# Patient Record
Sex: MALE | Race: WHITE | NOT HISPANIC OR LATINO | Employment: UNEMPLOYED | URBAN - METROPOLITAN AREA
[De-identification: names, ages, dates, MRNs, and addresses within clinical notes are randomized per-mention and may not be internally consistent; named-entity substitution may affect disease eponyms.]

---

## 2020-07-09 ENCOUNTER — HOSPITAL ENCOUNTER (OUTPATIENT)
Dept: RADIOLOGY | Facility: HOSPITAL | Age: 25
Discharge: HOME | End: 2020-07-09
Attending: ORTHOPAEDIC SURGERY
Payer: COMMERCIAL

## 2020-07-09 DIAGNOSIS — M25.512 LEFT SHOULDER PAIN: ICD-10-CM

## 2020-07-09 DIAGNOSIS — M25.512 LEFT SHOULDER PAIN: Primary | ICD-10-CM

## 2020-07-09 PROCEDURE — 73030 X-RAY EXAM OF SHOULDER: CPT | Mod: TC,LT

## 2021-08-06 ENCOUNTER — CLINICAL SUPPORT (OUTPATIENT)
Dept: URGENT CARE | Facility: CLINIC | Age: 26
End: 2021-08-06
Payer: COMMERCIAL

## 2021-08-06 DIAGNOSIS — Z02.83 ENCOUNTER FOR DRUG SCREENING: Primary | ICD-10-CM

## 2024-05-29 ENCOUNTER — OFFICE VISIT (OUTPATIENT)
Dept: NEUROLOGY | Facility: CLINIC | Age: 29
End: 2024-05-29
Payer: COMMERCIAL

## 2024-05-29 VITALS
OXYGEN SATURATION: 100 % | WEIGHT: 163.38 LBS | HEART RATE: 65 BPM | BODY MASS INDEX: 21.65 KG/M2 | DIASTOLIC BLOOD PRESSURE: 82 MMHG | SYSTOLIC BLOOD PRESSURE: 128 MMHG | HEIGHT: 73 IN

## 2024-05-29 DIAGNOSIS — R56.9 SEIZURE: Primary | ICD-10-CM

## 2024-05-29 PROCEDURE — 1159F MED LIST DOCD IN RCRD: CPT | Mod: CPTII,S$GLB,, | Performed by: NURSE PRACTITIONER

## 2024-05-29 PROCEDURE — 3074F SYST BP LT 130 MM HG: CPT | Mod: CPTII,S$GLB,, | Performed by: NURSE PRACTITIONER

## 2024-05-29 PROCEDURE — 3008F BODY MASS INDEX DOCD: CPT | Mod: CPTII,S$GLB,, | Performed by: NURSE PRACTITIONER

## 2024-05-29 PROCEDURE — 3079F DIAST BP 80-89 MM HG: CPT | Mod: CPTII,S$GLB,, | Performed by: NURSE PRACTITIONER

## 2024-05-29 PROCEDURE — 99999 PR PBB SHADOW E&M-EST. PATIENT-LVL III: CPT | Mod: PBBFAC,,, | Performed by: NURSE PRACTITIONER

## 2024-05-29 PROCEDURE — 99204 OFFICE O/P NEW MOD 45 MIN: CPT | Mod: S$GLB,,, | Performed by: NURSE PRACTITIONER

## 2024-05-29 RX ORDER — MESALAMINE 1.2 G/1
1.2 TABLET, DELAYED RELEASE ORAL
COMMUNITY

## 2024-05-29 NOTE — LETTER
May 29, 2024    Andrey Rodriguez  [address] [address]             [facility] Neurology  [address] [address]  Phone: [phone]  Fax: [phone] To Whom it May Concern:     Mr. Rodriugez was advised to adhere to seizure precautions for 6 months from his event on 5/10/24. This includes no driving, no cooking on a stove unattended, no heights/ladders, no working near water, no swimming unattended, no tub baths/take showers instead, no operating heavy machinery/power tools. If there are no other events, seizure precautions will end on 11/10/24, and he will be free to work without restriction.     Sincerely,        Cat Hylton, NP

## 2024-05-29 NOTE — PROGRESS NOTES
"Subjective:      Chief Complaint:  No chief complaint on file.      History of Present Illness  Andrey Rodriguez is a 28 y.o. male with "seizure". He has UC. He is a smoker.     Patient lives in [place].     He presents today for an initial visit for evaluation of "seizures". Patient has no memory of the event, aside from smoking a CBD vape for the first time just prior to the start of his episode. Patient fell off the couch and began shaking this morning in the early morning hours on 5/10/24. This was witnessed by his sister. Convulsions lasted 5-10 minutes. Episode was followed by incontinence. EMS was called and came to the emergency department where he had an additional seizure. Patient was given several rounds of Ativan and Versed, as well as 1000 mg of IV Keppra. He denies head trauma, alcohol or drug use. No recent new medications. Preceding this event, no issues with headaches, focal neurological deficits. Per father, no issues with seizures as a child. No sleep deprivation, excessive heat exposure prior to event.     He was taken to [facility].No underlying cause found-normal CT/MRI/EEG. Urine drug screen negative. Denying alcohol. No history of seizure disorder. Consulted with Neurology who recommends staying on Keppra for now, no driving until follow-up. He was also given Augmentin to cover possible pneumonia, due to aspiration following seizure events. No family history of seizures.     He is not currently driving.     He took Keppra for a few days, then stopped taking this due to insomnia and agitation. No further events. He admits that he started vaping CBD products around the time of his events. He stopped vaping.     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     His UC is well controlled. No recent flares. He consumes a regular diet.     I have reviewed all of this patient's past medical and surgical histories as well as family and social histories and active allergies and " medications as documented in the electronic medical record.    Review of Systems  Review of Systems   Constitutional:  Negative for activity change, appetite change, fatigue, fever and unexpected weight change.   HENT:  Negative for congestion, drooling, ear pain, hearing loss, mouth sores, sinus pressure, tinnitus, trouble swallowing and voice change.    Eyes: Negative.  Negative for photophobia and visual disturbance.         No Blurred vision   Respiratory:  Negative for apnea, choking and shortness of breath.    Cardiovascular:  Negative for chest pain, palpitations and leg swelling.   Gastrointestinal:  Negative for constipation, diarrhea, nausea and vomiting.   Genitourinary:  Negative for dysuria.   Musculoskeletal:  Negative for arthralgias, back pain, gait problem, joint swelling, myalgias, neck pain and neck stiffness.   Skin:  Negative for rash.   Neurological:  Negative for dizziness, tremors, seizures, syncope, facial asymmetry, speech difficulty, weakness, light-headedness, numbness and headaches.   Hematological:  Does not bruise/bleed easily.   Psychiatric/Behavioral:  Negative for agitation, behavioral problems, confusion, decreased concentration, dysphoric mood, hallucinations, sleep disturbance and suicidal ideas. The patient is not nervous/anxious.        Objective:     There were no vitals filed for this visit.       Exam:  Gen Appearance, well developed/nourished in no apparent distress  CV: 2+ distal pulses with no edema or swelling  Neuro:  MS: Awake, alert, oriented to place, person, time, situation. Sustains attention. Recent/remote memory intact, Language is full to spontaneous speech/repetition/naming/comprehension. Fund of Knowledge is full  CN: Optic discs are flat with normal vasculature, PERRL, Extraoccular movements and visual fields are full. Normal facial sensation and strength, Hearing symmetric, Tongue and Palate are midline and strong. Shoulder Shrug symmetric and  strong.  Motor: Normal bulk, tone, no abnormal movements. 5/5 strength bilateral upper/lower extremities with 2+ reflexes and bilateral plantar response  Sensory: symmetric to light touch, pain, temp, and vibration/proprioception. Romberg negative  Cerebellar: Finger-nose,Heal-shin, Rapid alternating movements intact  Gait: Normal stance, no ataxia    Imagin/10/24 MRI Brain:  Unremarkable brain MRI without contrast. No acute infarct. No specific findings to indicate a cause for seizures or sequela of seizure activity.     CXR 5/10/24:  Questionable small left basilar infiltrate. PA and lateral radiographs may be of benefit for further evaluation.     CONSULTING PHYSICIAN: LYDIA CASTILLO MD    DATE OF CONSULT: 05/10/2024    EEG RECORD NUMBER:  Case #[id number].    HISTORY:  The patient is a 28-year-old male presenting with breakthrough seizure activity with new onset seizure activity.    RECORDING DESCRIPTION:  A portable 18-channel recording is obtained on this patient during awake, drowsy, and light sleep states. During wakefulness, posteriorly dominant alpha rhythm of between 10 and 12 hertz in frequency and 80 microvolts in amplitude could be identified. During drowsiness, slower frequency components appear symmetrically over both hemispheres. Central vertex wave activity is seen during appropriate stages of sleep.     Simultaneous EKG monitoring reveals a normal sinus rhythm with a rate of 72 beats per minute.    IMPRESSION:  This is a normal waking, drowsy and light sleep recording.    REFERRING DIAGNOSIS:    RECORDING CONDITIONS:    MEDICATIONS:    EEG FINDINGS:    Dictated By: LYDIA CASTILLO MD    Labs:   5/10/24 UDS + for benzos, but given Ativan per ER, BMP showed low potassium, CBC showed leukocytosis  Assessment:   Andrey Rodriguez is a 28 y.o. male with suspected seizure x 2 on 5/10/24 with no prior history.   Plan:   I recommend:   Clinical picture most suggestive of seizure. It is  unclear if episode was epileptic or triggered by using a CBD vape for the first time just prior to his episode. EEG during admit was unremarkable; however, this does not rule out epilepsy.   -repeat EEG, as he was heavily medication at the time of his EEG.  MRI Brain unrevealing for contributing structural lesion.   He stopped taking Keppra, due to agitation and insomnia.   4. Seizure precautions reviewed. For 6 months after event on 5/10/24, no driving, no cooking on a stove unattended, no heights/ladders, no working near water, no swimming unattended, no tub baths/take showers instead, no operating heavy machinery/power tools. If there are no other events, seizure precautions will end on 11/10/24.   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 He is frustrated with seizure precautions.     FU 6 months    CC:  Dr. Luna Rodriguez

## 2024-08-06 ENCOUNTER — TELEPHONE (OUTPATIENT)
Dept: NEUROLOGY | Facility: CLINIC | Age: 29
End: 2024-08-06
Payer: COMMERCIAL

## 2024-08-14 ENCOUNTER — OFFICE VISIT (OUTPATIENT)
Dept: NEUROLOGY | Facility: CLINIC | Age: 29
End: 2024-08-14
Payer: COMMERCIAL

## 2024-08-14 VITALS
BODY MASS INDEX: 23.95 KG/M2 | DIASTOLIC BLOOD PRESSURE: 78 MMHG | OXYGEN SATURATION: 98 % | HEIGHT: 73 IN | WEIGHT: 180.69 LBS | SYSTOLIC BLOOD PRESSURE: 118 MMHG | RESPIRATION RATE: 16 BRPM | HEART RATE: 84 BPM

## 2024-08-14 DIAGNOSIS — R56.9 SEIZURE: Primary | ICD-10-CM

## 2024-08-14 PROCEDURE — 3008F BODY MASS INDEX DOCD: CPT | Mod: CPTII,S$GLB,, | Performed by: NURSE PRACTITIONER

## 2024-08-14 PROCEDURE — 99999 PR PBB SHADOW E&M-EST. PATIENT-LVL III: CPT | Mod: PBBFAC,,, | Performed by: NURSE PRACTITIONER

## 2024-08-14 PROCEDURE — 3074F SYST BP LT 130 MM HG: CPT | Mod: CPTII,S$GLB,, | Performed by: NURSE PRACTITIONER

## 2024-08-14 PROCEDURE — 1159F MED LIST DOCD IN RCRD: CPT | Mod: CPTII,S$GLB,, | Performed by: NURSE PRACTITIONER

## 2024-08-14 PROCEDURE — 3078F DIAST BP <80 MM HG: CPT | Mod: CPTII,S$GLB,, | Performed by: NURSE PRACTITIONER

## 2024-08-14 PROCEDURE — 99214 OFFICE O/P EST MOD 30 MIN: CPT | Mod: S$GLB,,, | Performed by: NURSE PRACTITIONER

## 2024-08-14 RX ORDER — LEVETIRACETAM 500 MG/1
500 TABLET ORAL 2 TIMES DAILY
Qty: 180 TABLET | Refills: 1 | Status: SHIPPED | OUTPATIENT
Start: 2024-08-14

## 2024-08-14 RX ORDER — LEVETIRACETAM 500 MG/1
500 TABLET ORAL 2 TIMES DAILY
COMMUNITY
End: 2024-08-14 | Stop reason: SDUPTHER

## 2024-08-14 NOTE — PATIENT INSTRUCTIONS
When you get your Keppra level done, you should take your evening Keppra at 8:00 p.m.the evening before your labs. Report to the lab at 7:30 a.m., have your labs drawn, and take your morning Keppra AFTER your labs are drawn.

## 2024-08-14 NOTE — PROGRESS NOTES
"Subjective:      Chief Complaint:  Neurologic Problem (Seizure follow up)      History of Present Illness  Andrey Rodriguez is a 28 y.o. male with "seizure". He has UC. He is a smoker.     Patient lives in [place].     Patient presents today after having a seizure on 8/1/24. He is here with his father today. He was seen in 5/29/24 after a first time seizure. EEG was ordered then, which he did not comply with.     Seizure on 8/1/24. He woke up with a holocephalic, pressure like headache. Three hours later he was walking to fix breakfast, fell to the floor and began convulsing. He did impact his head and sustain a laceration. No symptoms prior to onset, aside from his headache. This was witnessed and lasted for 3-5 minutes. No loss of bladder function. HE had muscle soreness afterwards. He denies sleep deprivation, vaping/drug ingestion, excess heat exposure. He refilled his prior Rx of Keppra that was prescribed after his last episode. He is tolerated this well without agitation or insomnia as prior.     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     Last event on 5/10/24. Patient has no memory of the event, aside from smoking a CBD vape for the first time just prior to the start of his episode. Patient fell off the couch and began shaking this morning in the early morning hours on 5/10/24. This was witnessed by his sister. Convulsions lasted 5-10 minutes. Episode was followed by incontinence. EMS was called and came to the emergency department where he had an additional seizure. Patient was given several rounds of Ativan and Versed, as well as 1000 mg of IV Keppra. He denies head trauma, alcohol or drug use. No recent new medications. Preceding this event, no issues with headaches, focal neurological deficits. Per father, no issues with seizures as a child. No sleep deprivation, excessive heat exposure prior to event.     He was taken to [facility].No underlying cause found-normal " CT/MRI/EEG. Urine drug screen negative. Denying alcohol. No history of seizure disorder. Consulted with Neurology who recommends staying on Keppra for now, no driving until follow-up. He was also given Augmentin to cover possible pneumonia, due to aspiration following seizure events. No family history of seizures.     He took Keppra for a few days, then stopped taking this due to insomnia and agitation. He admits that he started vaping CBD products around the time of his events. He stopped vaping.     I have reviewed all of this patient's past medical and surgical histories as well as family and social histories and active allergies and medications as documented in the electronic medical record.    Review of Systems  Review of Systems   Constitutional:  Negative for activity change, appetite change, fatigue, fever and unexpected weight change.   HENT:  Negative for congestion, drooling, ear pain, hearing loss, mouth sores, sinus pressure, tinnitus, trouble swallowing and voice change.    Eyes: Negative.  Negative for photophobia and visual disturbance.         No Blurred vision   Respiratory:  Negative for apnea, choking and shortness of breath.    Cardiovascular:  Negative for chest pain, palpitations and leg swelling.   Gastrointestinal:  Negative for constipation, diarrhea, nausea and vomiting.   Genitourinary:  Negative for dysuria.   Musculoskeletal:  Negative for arthralgias, back pain, gait problem, joint swelling, myalgias, neck pain and neck stiffness.   Skin:  Negative for rash.   Neurological:  Positive for seizures. Negative for dizziness, tremors, syncope, facial asymmetry, speech difficulty, weakness, light-headedness, numbness and headaches.   Hematological:  Does not bruise/bleed easily.   Psychiatric/Behavioral:  Negative for agitation, behavioral problems, confusion, decreased concentration, dysphoric mood, hallucinations, sleep disturbance and suicidal ideas. The patient is not nervous/anxious.         Objective:       Vitals:    24 1000   BP: 118/78   Pulse: 84   Resp: 16     Exam:  Gen Appearance, well developed/nourished in no apparent distress  CV: 2+ distal pulses with no edema or swelling  Neuro:  MS: Awake, alert, oriented to place, person, time, situation. Sustains attention. Recent/remote memory intact, Language is full to spontaneous speech/repetition/naming/comprehension. Fund of Knowledge is full  CN: Optic discs are flat with normal vasculature, PERRL, Extraoccular movements and visual fields are full. Normal facial sensation and strength, Hearing symmetric, Tongue and Palate are midline and strong. Shoulder Shrug symmetric and strong.  Motor: Normal bulk, tone, no abnormal movements. 5/5 strength bilateral upper/lower extremities with 2+ reflexes and bilateral plantar response  Sensory: symmetric to light touch, pain, temp, and vibration/proprioception. Romberg negative  Cerebellar: Finger-nose,Heal-shin, Rapid alternating movements intact  Gait: Normal stance, no ataxia    Imagin/10/24 MRI Brain:  Unremarkable brain MRI without contrast. No acute infarct. No specific findings to indicate a cause for seizures or sequela of seizure activity.     CXR 5/10/24:  Questionable small left basilar infiltrate. PA and lateral radiographs may be of benefit for further evaluation.     CONSULTING PHYSICIAN: LYDIA CASTILLO MD    DATE OF CONSULT: 05/10/2024    EEG RECORD NUMBER:  Case #[id number].    HISTORY:  The patient is a 28-year-old male presenting with breakthrough seizure activity with new onset seizure activity.    RECORDING DESCRIPTION:  A portable 18-channel recording is obtained on this patient during awake, drowsy, and light sleep states. During wakefulness, posteriorly dominant alpha rhythm of between 10 and 12 hertz in frequency and 80 microvolts in amplitude could be identified. During drowsiness, slower frequency components appear symmetrically over both hemispheres. Central  vertex wave activity is seen during appropriate stages of sleep.     Simultaneous EKG monitoring reveals a normal sinus rhythm with a rate of 72 beats per minute.    IMPRESSION:  This is a normal waking, drowsy and light sleep recording.    REFERRING DIAGNOSIS:    RECORDING CONDITIONS:    MEDICATIONS:    EEG FINDINGS:    Dictated By: LYDIA CASTILLO MD    Labs:   5/10/24 UDS + for benzos, but given Ativan per ER, BMP showed low potassium, CBC showed leukocytosis  Assessment:   Andrey Rodriguez is a 28 y.o. male with seizure x 2 on 5/10/24 with no prior history.   Plan:   I recommend:   Clinical picture most suggestive of epileptic seizure, especially after his second episode.   -first episode occurred in 5/2024 after CBD vape for the first time. No trigger for second episode.   -EEG during 5/2024 admit unremarkable. MRI Brain unrevealing for structural lesion.   -repeat EEG at this time.     He was not on Keppra at the time of his second event. He recently restarted this. I will refill his Keppra at 500 mg bid and check trough at this time.   -he is not currently having agitation or insomnia, as he experienced prior on Keppra.     3. Seizure precautions reviewed. For 6 months after event on 8/1/24, no driving, no cooking on a stove unattended, no heights/ladders, no working near water, no swimming unattended, no tub baths/take showers instead, no operating heavy machinery/power tools. If there are no other events, seizure precautions will end on 2/1/25.   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 He is frustrated with seizure precautions.     FU 3 months    CC:  Dr. Mila Norman

## 2024-09-05 ENCOUNTER — TELEPHONE (OUTPATIENT)
Dept: NEUROLOGY | Facility: CLINIC | Age: 29
End: 2024-09-05
Payer: COMMERCIAL

## 2024-09-05 NOTE — TELEPHONE ENCOUNTER
----- Message from Marilyn Bowers sent at 2024  3:04 PM CDT -----  Contact: PATIENT  Andrey Rodriguez  MRN: [id number]  : 1995  PCP: Mila Norman  Home Phone      [phone]  Work Phone      Not on file.  Mobile          [phone]      MESSAGE: Patient states that he was told that he could go to the hospital anytime to have an EEG done, he says that when he went he was told that they could not do it without an order and a scheduled appointment.  He is needing Cat to send the order to [facility] so he can get scheduled.          Phone: [phone]

## 2024-09-19 ENCOUNTER — HOSPITAL ENCOUNTER (OUTPATIENT)
Dept: NEUROLOGY | Facility: HOSPITAL | Age: 29
Discharge: HOME | End: 2024-09-19
Attending: NURSE PRACTITIONER
Payer: COMMERCIAL

## 2024-09-19 DIAGNOSIS — R56.9 SEIZURE: ICD-10-CM

## 2024-09-19 PROCEDURE — 95819 EEG AWAKE AND ASLEEP: CPT

## 2024-09-20 PROBLEM — R56.9 SEIZURE: Status: ACTIVE | Noted: 2024-09-20

## 2024-09-30 ENCOUNTER — TELEPHONE (OUTPATIENT)
Dept: NEUROLOGY | Facility: CLINIC | Age: 29
End: 2024-09-30
Payer: COMMERCIAL

## 2024-09-30 NOTE — TELEPHONE ENCOUNTER
"Notified patient Cat Hylton NP is out of the office today, will return tomorrow, EEG results on her desk to review and patient verbalized understanding. Patient states "have been out of medicine," has not been feeling "bad but sometimes" feels "different." Notified patient message forwarded to Cat Hylton NP to advise. Patient verbalized understanding.  "

## 2024-09-30 NOTE — TELEPHONE ENCOUNTER
----- Message from Marilyn sent at 2024  4:36 PM CDT -----  Contact: PATIENT  Andrey Rodriguez  MRN: [id number]  : 1995  PCP: Mial Norman  Home Phone      [phone]  Work Phone      Not on file.  Mobile          [phone]      MESSAGE: Patient is calling for his EEG results.          Phone: [phone]

## 2024-10-08 ENCOUNTER — TELEPHONE (OUTPATIENT)
Dept: NEUROLOGY | Facility: CLINIC | Age: 29
End: 2024-10-08
Payer: COMMERCIAL

## 2024-10-08 NOTE — TELEPHONE ENCOUNTER
Spoke with patients mother Clementina regarding EEG results, she v/u. Informed her of rx refills sent in August to [facility]'s pharmacy.

## 2024-10-08 NOTE — TELEPHONE ENCOUNTER
EEG was normal, but this does not rule out epilepsy. I advise that he continue with Keppra for seizure prevention. I see a phone note that states that he is out of his medication. I refilled this in August. Please clarify.

## 2024-10-24 ENCOUNTER — TELEPHONE (OUTPATIENT)
Dept: NEUROLOGY | Facility: CLINIC | Age: 29
End: 2024-10-24
Payer: COMMERCIAL

## 2024-10-24 DIAGNOSIS — R56.9 SEIZURE: Primary | ICD-10-CM

## 2024-11-07 ENCOUNTER — TELEPHONE (OUTPATIENT)
Dept: NEUROLOGY | Facility: CLINIC | Age: 29
End: 2024-11-07
Payer: COMMERCIAL

## 2024-11-07 NOTE — TELEPHONE ENCOUNTER
This patient was referred to Epileptology. He is on my schedule for 11/12. Please see prior phone notes related to patient complaints. Can we determine if this appointment with me is needed by the patient prior to seeing Epileptology?

## 2024-11-08 NOTE — TELEPHONE ENCOUNTER
Attempted to contact patient in regards to upcoming appointment on 11/12, since patient was referred to Epileptology. NA/no VM set up to leave a message. Patient is not active on My Chart portal.

## 2024-12-17 ENCOUNTER — TELEPHONE (OUTPATIENT)
Dept: NEUROLOGY | Facility: CLINIC | Age: 29
End: 2024-12-17
Payer: COMMERCIAL

## 2024-12-17 NOTE — TELEPHONE ENCOUNTER
Per last OV 8/1/24 -  3. Seizure precautions reviewed. For 6 months after event on 8/1/24, no driving, no cooking on a stove unattended, no heights/ladders, no working near water, no swimming unattended, no tub baths/take showers instead, no operating heavy machinery/power tools. If there are no other events, seizure precautions will end on 2/1/25.   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 He is frustrated with seizure precautions.     Spoke with patient, He sates he is missing out on job opportunities and is about to lose his place to live since he cannot work a better job. He is asking if he can be cleared earlier than the 6 months. I informed the patient the precautions cannot be lifted as it has not been 6 months since his last event on 8/1/24, and that these precautions are put into place for any person who has a seizure. he states he has not been taking the medication prescribed by BRYANT Shelton since August, as well as he has not been following the seizure precautions put in place because he lives alone. Patient also mentioned disability paperwork, he states BRYANT Shelton told him about this at his last appointment, but he declined it. I informed him there is no mention of disability paperwork in his last visit note. Patient continued to voice he wants the precautions lifted since he has not had an event since. I informed the patient I will message my supervisor Chinyere COOPER so she can speak with him.

## 2024-12-17 NOTE — TELEPHONE ENCOUNTER
"Spoke with patient. Advised that provider recommendations were not followed. States he has been working, driving, climbing, etc. NP. Advised that Cat Hylton, BRYANT could not clear him, since he has not followed recommendations properly. Advised that he needed to follow up with Epileptology or continue taking his seizure medication (Keppra) and wait the full 6 months to be reevaluated. Patient states he is not going to Epileptology and he will not take the seizure medication because it made him feel funny and "does not need it." States he stopped taking Keppra in August. States he had seizure-like activity only when he was taking CBD gummies. States since stopping, he has not had an episode. Patient demanding to be cleared without seeing epileptology or taking seizure medications, due to not having any recent episodes. Advised that this is not possible without him following proper seizure protocol and recommendations. Patient states "I don't need a clearance, I start working on Monday at my old job." Patient then hung up.   "

## 2024-12-17 NOTE — TELEPHONE ENCOUNTER
----- Message from Marilyn sent at 2024  9:53 AM CST -----  Andrey Rodriguez  MRN: [id number]  : 1995  PCP: Mila Norman  Home Phone      [phone]  Work Phone      Not on file.  Mobile          [phone]      MESSAGE: Patient states that Cat is suppose to clear him in January but would like to know if there is anyway that he can be cleared before then because he is missing out on job opportunities.          Phone: [phone]

## 2025-06-03 ENCOUNTER — TELEPHONE (OUTPATIENT)
Dept: NEUROLOGY | Facility: CLINIC | Age: 30
End: 2025-06-03
Payer: COMMERCIAL

## 2025-06-04 ENCOUNTER — OFFICE VISIT (OUTPATIENT)
Dept: NEUROLOGY | Facility: CLINIC | Age: 30
End: 2025-06-04
Payer: COMMERCIAL

## 2025-06-04 VITALS
OXYGEN SATURATION: 98 % | WEIGHT: 183.19 LBS | SYSTOLIC BLOOD PRESSURE: 134 MMHG | DIASTOLIC BLOOD PRESSURE: 78 MMHG | BODY MASS INDEX: 24.28 KG/M2 | RESPIRATION RATE: 16 BRPM | HEART RATE: 58 BPM | HEIGHT: 73 IN

## 2025-06-04 DIAGNOSIS — G40.909 SEIZURE DISORDER: Primary | ICD-10-CM

## 2025-06-04 PROCEDURE — 3008F BODY MASS INDEX DOCD: CPT | Mod: CPTII,S$GLB,, | Performed by: NURSE PRACTITIONER

## 2025-06-04 PROCEDURE — 1160F RVW MEDS BY RX/DR IN RCRD: CPT | Mod: CPTII,S$GLB,, | Performed by: NURSE PRACTITIONER

## 2025-06-04 PROCEDURE — 99999 PR PBB SHADOW E&M-EST. PATIENT-LVL III: CPT | Mod: PBBFAC,,, | Performed by: NURSE PRACTITIONER

## 2025-06-04 PROCEDURE — 3075F SYST BP GE 130 - 139MM HG: CPT | Mod: CPTII,S$GLB,, | Performed by: NURSE PRACTITIONER

## 2025-06-04 PROCEDURE — 1159F MED LIST DOCD IN RCRD: CPT | Mod: CPTII,S$GLB,, | Performed by: NURSE PRACTITIONER

## 2025-06-04 PROCEDURE — 3078F DIAST BP <80 MM HG: CPT | Mod: CPTII,S$GLB,, | Performed by: NURSE PRACTITIONER

## 2025-06-04 PROCEDURE — 99214 OFFICE O/P EST MOD 30 MIN: CPT | Mod: S$GLB,,, | Performed by: NURSE PRACTITIONER

## 2025-06-04 RX ORDER — LEVETIRACETAM 500 MG/1
1000 TABLET, EXTENDED RELEASE ORAL NIGHTLY
Qty: 180 TABLET | Refills: 1 | Status: SHIPPED | OUTPATIENT
Start: 2025-06-04 | End: 2026-06-04

## 2025-06-09 ENCOUNTER — TELEPHONE (OUTPATIENT)
Dept: NEUROLOGY | Facility: CLINIC | Age: 30
End: 2025-06-09
Payer: COMMERCIAL

## 2025-06-09 NOTE — TELEPHONE ENCOUNTER
"Per Ara Guthrie, Director of Clinic Operations,     "Called and spoke to patient, explained that the letter provided for his employer will not change. He had a seizure we need to be sure he does not harm himself or anyone else by continuing to do the activities the provider advised him not to do. He is very upset that this is happening again. I explained the importance of him keeping his follow-ups and continuing to take his medications. He states that since we can't change it and he is going to have to go live under a bridge, then he is just going to use the gun on his counter and blow his brains out. He ended our call saying no one here could help him. I called in a welfare check to the [facility] office."      "

## 2025-06-09 NOTE — TELEPHONE ENCOUNTER
"Patient called office requesting to speak with Cat Hylton NP. Asked patient what the call was regarding to he stated that Ms. Shelton completely for him to clear him. Patient stated that his job is now firing him from the clearance forms that was sent in from Cat. Advised patient that Cat was not in clinic today and that he could speak with one of the Neuro staff to assist him more patient refused to speak with staff members and only want to speak with Cat Hylton. I advised patient that he could speak with Cat Severino nurse, patient stated " I don't want to speak to her, she doesn't know anything and if she can't fix my paperwork I'm not speaking with her." Patient stated on the if he looses his job because of this clearance form he would blow his brains out. I tried several time to connect patient to a nurse and patient refused and hung up.     "